# Patient Record
Sex: MALE | Race: BLACK OR AFRICAN AMERICAN | URBAN - METROPOLITAN AREA
[De-identification: names, ages, dates, MRNs, and addresses within clinical notes are randomized per-mention and may not be internally consistent; named-entity substitution may affect disease eponyms.]

---

## 2020-07-06 ENCOUNTER — OFFICE VISIT (OUTPATIENT)
Dept: URBAN - METROPOLITAN AREA CLINIC 17 | Facility: CLINIC | Age: 25
End: 2020-07-06

## 2020-07-06 ENCOUNTER — DASHBOARD ENCOUNTERS (OUTPATIENT)
Age: 25
End: 2020-07-06

## 2020-07-06 DIAGNOSIS — K59.01 SLOW TRANSIT CONSTIPATION: ICD-10-CM

## 2020-07-06 DIAGNOSIS — D64.89 OTHER SPECIFIED ANEMIAS: ICD-10-CM

## 2020-07-06 DIAGNOSIS — R63.0 ANOREXIA: ICD-10-CM

## 2020-07-06 DIAGNOSIS — R63.4 WEIGHT LOSS: ICD-10-CM

## 2020-07-06 PROBLEM — 79890006: Status: ACTIVE | Noted: 2020-07-06

## 2020-07-06 PROBLEM — 89362005: Status: ACTIVE | Noted: 2020-07-06

## 2020-07-06 PROBLEM — 129851009: Status: ACTIVE | Noted: 2020-07-06

## 2020-07-06 PROBLEM — 724697004: Status: ACTIVE | Noted: 2020-07-06

## 2020-07-06 PROBLEM — 46737006: Status: ACTIVE | Noted: 2020-07-06

## 2020-07-06 PROBLEM — 35298007: Status: ACTIVE | Noted: 2020-07-06

## 2020-07-06 PROBLEM — 34713006: Status: ACTIVE | Noted: 2020-07-06

## 2020-07-06 PROCEDURE — 99204 OFFICE O/P NEW MOD 45 MIN: CPT | Performed by: INTERNAL MEDICINE

## 2020-07-06 RX ORDER — PANTOPRAZOLE SODIUM 40 MG/1
1 TABLET TABLET, DELAYED RELEASE ORAL ONCE A DAY
Qty: 90 | Refills: 3 | OUTPATIENT
Start: 2020-07-06

## 2020-07-06 RX ORDER — IBUPROFEN 200 MG/1
1 TABLET WITH FOOD OR MILK AS NEEDED TABLET, FILM COATED ORAL THREE TIMES A DAY
Status: ACTIVE | COMMUNITY

## 2020-07-06 RX ORDER — IBUPROFEN SODIUM 256 MG/1
1 TABLET WITH FOOD OR MILK AS NEEDED TABLET, COATED ORAL THREE TIMES A DAY
Status: ACTIVE | COMMUNITY

## 2020-07-06 NOTE — HPI-TODAY'S VISIT:
The pt has history of chronic constipation who presents for evaluation of constipation and low back pain. Of note, pt states that he has struggled with constipation for the last 10  yrs. Of note, pt had a CT was negative for acute process and KUB revealed fecal stasis. The pt's labs reveal normochromic normocytic anemia. Pt's hemoglobin was 13.  He notes that he has lost approx 15 lbs over the last 8 months.
